# Patient Record
Sex: MALE | Race: WHITE | NOT HISPANIC OR LATINO | Employment: FULL TIME | URBAN - NONMETROPOLITAN AREA
[De-identification: names, ages, dates, MRNs, and addresses within clinical notes are randomized per-mention and may not be internally consistent; named-entity substitution may affect disease eponyms.]

---

## 2022-08-02 ENCOUNTER — OFFICE VISIT (OUTPATIENT)
Dept: FAMILY MEDICINE | Facility: OTHER | Age: 22
End: 2022-08-02
Attending: PHYSICIAN ASSISTANT
Payer: COMMERCIAL

## 2022-08-02 VITALS
DIASTOLIC BLOOD PRESSURE: 78 MMHG | SYSTOLIC BLOOD PRESSURE: 120 MMHG | TEMPERATURE: 98.7 F | HEART RATE: 77 BPM | BODY MASS INDEX: 24.9 KG/M2 | WEIGHT: 177.9 LBS | HEIGHT: 71 IN | RESPIRATION RATE: 20 BRPM | OXYGEN SATURATION: 99 %

## 2022-08-02 DIAGNOSIS — S01.81XA CHIN LACERATION, INITIAL ENCOUNTER: Primary | ICD-10-CM

## 2022-08-02 PROCEDURE — 250N000009 HC RX 250: Performed by: PHYSICIAN ASSISTANT

## 2022-08-02 PROCEDURE — 12011 RPR F/E/E/N/L/M 2.5 CM/<: CPT | Performed by: PHYSICIAN ASSISTANT

## 2022-08-02 RX ADMIN — Medication: at 16:36

## 2022-08-02 ASSESSMENT — PAIN SCALES - GENERAL: PAINLEVEL: NO PAIN (1)

## 2022-08-02 NOTE — NURSING NOTE
Pt presents to clinic today for a chin laceration. Patient states he was wrestling in the water with a friend and his friends head accidentally hit his chin causing a laceration.       FOOD SECURITY SCREENING QUESTIONS:    The next two questions are to help us understand your food security.  If you are feeling you need any assistance in this area, we have resources available to support you today.    Hunger Vital Signs:  Within the past 12 months we worried whether our food would run out before we got money to buy more. Never  Within the past 12 months the food we bought just didn't last and we didn't have money to get more. Never            Medication Reconciliation: complete  Sarah Rueda, MEKA,LPN on 8/2/2022 at 3:57 PM

## 2022-08-02 NOTE — PATIENT INSTRUCTIONS
Please refer to your AVS for follow up and pain/symptoms management recommendations (I.e.: medications, helpful conservative treatment modalities, appropriate follow up if need to a specialist or family practice, etc.). Please return to urgent care if your symptoms change or worsen.     Discharge instructions:  -If you were prescribed a medication(s), please take this as prescribed/directed  -Monitor your symptoms, if changing/worsening, return to UC/ER or PCP for follow up    Laceration   -Depending on the extent of your wound it was closed with either dermabond glue, staples or sutures if needed.   -Most sutures/stitches stay in place for 5-7 days - your primary care provider can remove this  -Monitor for infection.  Monitor for signs of infection such as fevers, chills, increasing redness/warmth of the site.   -Avoid swimming in pools/lakes until your site is healed.  -If your tetanus status is out of date, the urgent care provider likely discussed this with you. We recommend keeping your immunizations and tetanus status up to date.   -For pain control (if needed), if you are able to take Ibuprofen and Tylenol, we recommend alternating these (see note below). Do not wear a patch over your eye (unless directed to do so).    -Alternate every 4 hours as needed. I.e.: Ibuprofen at 8am, Tylenol 12pm, Ibuprofen 4pm    -Daily maximum of Tylenol is 4000mg (recommend staying under 3000mg)   -Daily maximum of Ibuprofen is 3200 mg

## 2022-08-02 NOTE — PROGRESS NOTES
ASSESSMENT/PLAN:    I have reviewed the nursing notes.  I have reviewed the findings, diagnosis, plan and need for follow up with the patient.    1. Chin laceration, initial encounter  - lido-EPINEPHrine-tetracaine (LET) topical gel GEL  - REPAIR SUPERFICIAL, WOUND BODY < =2.5CM  - Vital signs stable. PE consistent with laceration of chin. Refer to procedure note below for further description of suture/laceration repair. Suture removal in 5-7 days. Keep clean, dry and covered. Can shower as usual, avoid swimming in hot tubs/pools/lakes until sutures removed and laceration healed as can lead to potential infection. Tetanus: declined. Antibiotic: triple antibiotic over site. Monitor for fevers, chills, signs of infection/cellulitis - if any concerning symptoms arise, patient agreeable to return. Patient is in agreement and understanding of the above treatment plan. All questions and concerns were addressed and answered to patient's satisfaction. AVS reviewed with patient.     Discussed warning signs/symptoms indicative of need to f/u    Follow up if symptoms persist or worsen or concerns    I explained my diagnostic considerations and recommendations to the patient, who voiced understanding and agreement with the treatment plan. All questions were answered. We discussed potential side effects of any prescribed or recommended therapies, as well as expectations for response to treatments.    Olivia Wiley PA-C  8/2/2022  4:21 PM    HPI:    Donato Gary is a 22 year old male  who presents to [facility] today for concerns of laceration to chin. Injury 2 hours prior to arrival. Hemostasis control: good.     Pain: 1/10  Changes to ROM/Strength: none  Treatments tried since injury: cleaned site.     Any allergies to suture or latex: No  Prior experience with local anesthetics: YES  Any adverse reaction to local anesthetics: No    Patient on blood thinners: No    Denies LOC. Denies SOB, fevers, chills, local or systemic  "signs of infection.     Immunization (Tetanus) UTD: No, declines today    PCP: none    No past medical history on file.  No past surgical history on file.  Social History     Tobacco Use     Smoking status: Never Smoker     Smokeless tobacco: Never Used   Substance Use Topics     Alcohol use: Yes     No current outpatient medications on file.     Allergies   Allergen Reactions     No Known Drug Allergies      Past medical history, past surgical history, current medications and allergies reviewed and accurate to the best of my knowledge.      ROS:  Refer to HPI    /78 (BP Location: Right arm, Patient Position: Sitting, Cuff Size: Adult Large)   Pulse 77   Temp 98.7  F (37.1  C) (Tympanic)   Resp 20   Ht 1.803 m (5' 11\")   Wt 80.7 kg (177 lb 14.4 oz)   SpO2 99%   BMI 24.81 kg/m      EXAM:  General Appearance: Well appearing 22 year old male, appropriate appearance for age. No acute distress   Respiratory: normal chest wall and respirations.  Normal effort.  Clear to auscultation bilaterally, no wheezing, crackles or rhonchi.  No increased work of breathing.  No cough appreciated.  Cardiac: RRR with no murmurs  Dermatological: 1 inch linear laceration to chin  Psychological: normal affect, alert, oriented, and pleasant.     Labs:  none    Xray:  none    Procedural note:   Options are discussed and patient decided to proceed with the suture placement.  Risks and benefits discussed.  Written consent obtained.    Preparation: Patient was prepped and draped in usual sterile fashion.  Irrigation solution: saline   Body area: chin  Laceration description: 1 inch, linear, 1 mm deep  Contamination: No  Debridement: No  Foreign bodies: No  Tendon involvement: No  Anesthesia: Local  Anesthetic Type: LET (lido, epi, tetracaine)  Anesthetic Total: 3 mL  Closure: Simple  Suture: 5-0 nylon, nonabsorbable, interrupted  Number of Sutures: 6  Approximation: close  Suture removal in 5-7 day(s)     Patient tolerance: Patient " tolerated the procedure well with no immediate complications.

## (undated) RX ORDER — LIDOCAINE HYDROCHLORIDE 10 MG/ML
INJECTION, SOLUTION EPIDURAL; INFILTRATION; INTRACAUDAL; PERINEURAL
Status: DISPENSED
Start: 2022-08-02